# Patient Record
Sex: FEMALE | URBAN - METROPOLITAN AREA
[De-identification: names, ages, dates, MRNs, and addresses within clinical notes are randomized per-mention and may not be internally consistent; named-entity substitution may affect disease eponyms.]

---

## 2024-11-18 ENCOUNTER — TELEPHONE (OUTPATIENT)
Dept: HEALTH INFORMATION MANAGEMENT | Facility: OTHER | Age: 44
End: 2024-11-18
Payer: COMMERCIAL

## 2024-12-19 ENCOUNTER — APPOINTMENT (OUTPATIENT)
Dept: NEUROLOGY | Facility: MEDICAL CENTER | Age: 44
End: 2024-12-19
Attending: PSYCHIATRY & NEUROLOGY
Payer: COMMERCIAL

## 2025-01-10 ENCOUNTER — TELEPHONE (OUTPATIENT)
Dept: NEUROLOGY | Facility: MEDICAL CENTER | Age: 45
End: 2025-01-10

## 2025-01-10 NOTE — TELEPHONE ENCOUNTER
NEUROLOGY PATIENT PRE-VISIT PLANNING     Patient was NOT contacted to complete PVP.  Note: Patient will not be contacted if there is no indication to call.     Patient Appointment is scheduled as: New Patient     Is visit type and length scheduled correctly? Yes    [employer] Patient is checked in Patient Demographics? Yes    3.   Is referral attached to visit? Yes    4. Were records received from referring provider? Yes    4. Patient was NOT contacted to have someone accompany them to visit.     5. Is this appointment scheduled as a Hospital Follow-Up?  No    6. Does the patient require any pre procedure or post procedure follow up? No    7. If any orders were placed at last visit or intended to be done for this visit do we have Results/Consult Notes? No  Labs - Labs were not ordered at last office visit.  Imaging - Imaging was not ordered at last office visit.  Referrals - No referrals were ordered at last office visit.  Note: If patient appointment is for lab or imaging review and patient did not complete the studies, check with provider if OK to reschedule patient until completed.    8. If patient appointment is for Botox - is order pended for provider? N/A    9. Was Plan Assessment from last Neurology Office Visit Reviewed?  No

## 2025-01-25 NOTE — PROGRESS NOTES
[facility]  GENERAL NEUROLOGY  NEW PATIENT VISIT    Referral source: [facility]    CC: Headache; fibromyalgia; chronic pain    HISTORY OF ILLNESS:  Mica Cabrera is a 44 y.o. woman with a history most notable for headache.  She presented to her primary provider in [place] and was referred to neurology for better management of her headache syndromes.  She was recently diagnosed with fibromyalgia and requested genetic testing for Erler's Danlos syndrome.  Today, Mica provided the following history:    Headache description:    The following is a summary of headache symptoms, presented in my standard format:    Family History:   Age at onset (years):   Location:   Radiation:   Frequency:   Duration:   Headache Days/Month:   Quality:   Intensity:   Aura:   Photophobia/Phonophobia/Nausea/Vomiting:   Provoked by Physical Activity?:   Triggers:   Associated Symptoms:   Autonomic Signs (such as ptosis, miosis, conjunctival injection, rhinorrhea, increased lacrimation):   Head Trauma:   Association with Menses:   ED Visits:   Hospitalizations:   Missed Work Days ():   Sleep (hours/night):   Caffeine Intake:   Hydration:   Nutrition:   Exercise:   Analgesic Overuse:     Current Medication Regimen:  -     Medications Tried: Response  Preventive:  -     Rescue:  -     Medications Not Tried:  -     MEDICAL AND SURGICAL HISTORY:  No past medical history on file.  No past surgical history on file.  MEDICATIONS:  No current outpatient medications on file.     SOCIAL HISTORY:  Social History     Tobacco Use    Smoking status: Not on file    Smokeless tobacco: Not on file   Substance Use Topics    Alcohol use: Not on file     Social History     Social History Narrative    Not on file     FAMILY HISTORY:  No family history on file.    Vitals ***    REVIEW OF SYSTEMS:  A ROS was completed.  Pertinent positives and negatives were included in the HPI, above.  All other systems were reviewed and  "are negative.    PHYSICAL EXAM:  General/Medical:  - NAD  - hair, skin, nails, and joints were normal    Neuro:  MENTAL STATUS: awake and alert; no deficits of speech or language; oriented to person, place, and time; affect was appropriate to situation    CRANIAL NERVES:    II: acuity: J***/J***, fields: intact to confrontation, pupils: 3/3 to 2/2 without a relative afferent pupillary defect, discs: sharp, no red desaturation noted    III/IV/VI: versions: intact without nystagmus    V: facial sensation: symmetric to light touch    VII: facial expression: symmetric    VIII: hearing: intact to finger rub    IX/X: palate: elevates symmetrically    XI: shoulder shrug: symmetric    XII: tongue: midline    MOTOR:  - bulk: normal throughout  - tone: normal throughout  Upper Extremity Strength  (R/L)    5/5   Elbow flexion 5/5   Elbow extension 5/5   Shoulder abduction 5/5     Lower Extremity Strength  (R/L)   Hip flexion 5/5   Knee extension 5/5   Knee flexion 5/5   Ankle plantarflexion 5/5   Ankle dorsiflexion 5/5     - pronator drift: absent  - abnormal movements: none    SENSATION:  - light touch: ***  - vibration (R/L, seconds): ***/*** at the great toes  - temperature:***  - pinprick: ***  - proprioception: ***  - Romberg: absent    COORDINATION:  - finger to nose: normal, no ataxia on exam  - finger tapping: rapid and accurate, bilaterally  - foot tapping:***  - foot inversion/ eversion: ***  - clonus:***    REFLEXES:  Reflex Right Left   BR 2+ 2+   Biceps 2+ 2+   Triceps 2+ 2+   Patellae 2+ 2+   Achilles 2+ 2+   Toes down down     GAIT:  - narrow base and normal  - heel-walk:   - toe-walk:   - tandem gait: intact    REVIEW OF IMAGING STUDIES: I reviewed the following studies:  MRI Brain:  Date: N/A  W/o and w/ contrast?: N/A  Indication: \"N/A\"  Comparison: N/A  Impression:  \"N/A\"    REVIEW OF LABORATORY STUDIES:  No current pertinent labs    ASSESSMENT& PLAN:          "

## 2025-01-29 ENCOUNTER — APPOINTMENT (OUTPATIENT)
Dept: NEUROLOGY | Facility: MEDICAL CENTER | Age: 45
End: 2025-01-29

## 2025-03-12 ENCOUNTER — TELEPHONE (OUTPATIENT)
Dept: NEUROLOGY | Facility: MEDICAL CENTER | Age: 45
End: 2025-03-12

## 2025-03-13 NOTE — PROGRESS NOTES
[facility]  GENERAL NEUROLOGY  NEW PATIENT VISIT    Referral source: [facility]    CC: Headache; fibromyalgia, chronic pain    HISTORY OF ILLNESS:  Mica Cabrera is a 44 y.o. woman with a history most notable for headaches.  She presented to her primary provider in [place] with complaints of ongoing headaches.  There is a suspicion that she has fibromyalgia versus Sera Danlos syndrome.  Today, Mica provided the following history:    Headache description    The following is a summary of headache symptoms, presented in my standard format:    Family History:   Age at onset (years):   Location:   Radiation:   Frequency:   Duration:   Headache Days/Month:   Quality:   Intensity:   Aura:   Photophobia/Phonophobia/Nausea/Vomiting:   Provoked by Physical Activity?:   Triggers:   Associated Symptoms:   Autonomic Signs (such as ptosis, miosis, conjunctival injection, rhinorrhea, increased lacrimation):   Head Trauma:   Association with Menses:   ED Visits:   Hospitalizations:   Missed Work Days ():   Sleep (hours/night):   Caffeine Intake:   Hydration:   Nutrition:   Exercise:   Analgesic Overuse:     Current Medication Regimen:  -     Medications Tried: Response  Preventive:  -     Rescue:  -     Medications Not Tried:  -     MEDICAL AND SURGICAL HISTORY:  No past medical history on file.  No past surgical history on file.  MEDICATIONS:  No current outpatient medications on file.     SOCIAL HISTORY:  Social History     Tobacco Use    Smoking status: Not on file    Smokeless tobacco: Not on file   Substance Use Topics    Alcohol use: Not on file     Social History     Social History Narrative    Not on file     FAMILY HISTORY:  No family history on file.    Vitals ***    REVIEW OF SYSTEMS:  A ROS was completed.  Pertinent positives and negatives were included in the HPI, above.  All other systems were reviewed and are negative.    PHYSICAL EXAM:  General/Medical:  - NAD  - hair, skin,  "nails, and joints were normal    Neuro:  MENTAL STATUS: awake and alert; no deficits of speech or language; oriented to person, place, and time; affect was appropriate to situation    CRANIAL NERVES:    II: acuity: J***/J***, fields: intact to confrontation, pupils: 3/3 to 2/2 without a relative afferent pupillary defect, discs: sharp, no red desaturation noted    III/IV/VI: versions: intact without nystagmus    V: facial sensation: symmetric to light touch    VII: facial expression: symmetric    VIII: hearing: intact to finger rub    IX/X: palate: elevates symmetrically    XI: shoulder shrug: symmetric    XII: tongue: midline    MOTOR:  - bulk: normal throughout  - tone: normal throughout  Upper Extremity Strength  (R/L)    5/5   Elbow flexion 5/5   Elbow extension 5/5   Shoulder abduction 5/5     Lower Extremity Strength  (R/L)   Hip flexion 5/5   Knee extension 5/5   Knee flexion 5/5   Ankle plantarflexion 5/5   Ankle dorsiflexion 5/5     - pronator drift: absent  - abnormal movements: none    SENSATION:  - light touch: ***  - vibration (R/L, seconds): ***/*** at the great toes  - temperature:***  - pinprick: ***  - proprioception: ***  - Romberg: absent    COORDINATION:  - finger to nose: normal, no ataxia on exam  - finger tapping: rapid and accurate, bilaterally  - foot tapping:***  - foot inversion/ eversion: ***  - clonus:***    REFLEXES:  Reflex Right Left   BR 2+ 2+   Biceps 2+ 2+   Triceps 2+ 2+   Patellae 2+ 2+   Achilles 2+ 2+   Toes down down     GAIT:  - narrow base and normal  - heel-walk:   - toe-walk:   - tandem gait: intact    REVIEW OF IMAGING STUDIES: I reviewed the following studies:  MRI Brain:  Date: N/A  W/o and w/ contrast?:  N/A  Indication: \"N/A\"  Comparison: N/A  Impression:  \"N/A\"    REVIEW OF LABORATORY STUDIES:  No current pertinent labs    ASSESSMENT& PLAN:      Signed: ESTUARDO Long"

## 2025-03-14 ENCOUNTER — APPOINTMENT (OUTPATIENT)
Dept: NEUROLOGY | Facility: MEDICAL CENTER | Age: 45
End: 2025-03-14

## 2025-07-02 ENCOUNTER — OFFICE VISIT (OUTPATIENT)
Facility: MEDICAL CENTER | Age: 45
End: 2025-07-02
Attending: PSYCHIATRY & NEUROLOGY
Payer: COMMERCIAL

## 2025-07-02 VITALS
RESPIRATION RATE: 16 BRPM | SYSTOLIC BLOOD PRESSURE: 114 MMHG | TEMPERATURE: 97.7 F | HEIGHT: 62 IN | DIASTOLIC BLOOD PRESSURE: 76 MMHG | HEART RATE: 113 BPM | OXYGEN SATURATION: 97 % | WEIGHT: 180 LBS | BODY MASS INDEX: 33.13 KG/M2

## 2025-07-02 DIAGNOSIS — G43.009 MIGRAINE WITHOUT AURA AND WITHOUT STATUS MIGRAINOSUS, NOT INTRACTABLE: Primary | ICD-10-CM

## 2025-07-02 PROCEDURE — 99205 OFFICE O/P NEW HI 60 MIN: CPT | Performed by: PSYCHIATRY & NEUROLOGY

## 2025-07-02 PROCEDURE — 3074F SYST BP LT 130 MM HG: CPT | Performed by: PSYCHIATRY & NEUROLOGY

## 2025-07-02 PROCEDURE — 3078F DIAST BP <80 MM HG: CPT | Performed by: PSYCHIATRY & NEUROLOGY

## 2025-07-02 RX ORDER — SUMATRIPTAN 20 MG/1
SPRAY NASAL
Qty: 10 EACH | Refills: 5 | Status: SHIPPED | OUTPATIENT
Start: 2025-07-02 | End: 2025-07-16

## 2025-07-02 RX ORDER — TOPIRAMATE 25 MG/1
TABLET, FILM COATED ORAL
Qty: 49 TABLET | Refills: 0 | Status: SHIPPED | OUTPATIENT
Start: 2025-07-02 | End: 2025-07-23

## 2025-07-02 RX ORDER — BUPROPION HYDROCHLORIDE 300 MG/1
TABLET ORAL
COMMUNITY
Start: 2025-06-10

## 2025-07-02 RX ORDER — FERROUS SULFATE 325(65) MG
TABLET ORAL
COMMUNITY
Start: 2025-06-10

## 2025-07-02 RX ORDER — MONTELUKAST SODIUM 10 MG/1
10 TABLET ORAL DAILY
COMMUNITY
Start: 2025-04-19

## 2025-07-02 RX ORDER — LISINOPRIL 5 MG/1
5 TABLET ORAL DAILY
COMMUNITY
Start: 2025-06-22

## 2025-07-02 RX ORDER — ONDANSETRON 4 MG/1
4 TABLET, FILM COATED ORAL EVERY 6 HOURS PRN
Qty: 20 TABLET | Refills: 5 | Status: SHIPPED | OUTPATIENT
Start: 2025-07-02

## 2025-07-02 RX ORDER — ARIPIPRAZOLE 2 MG/1
2 TABLET ORAL NIGHTLY
COMMUNITY
Start: 2025-06-26

## 2025-07-02 RX ORDER — CETIRIZINE HYDROCHLORIDE 10 MG/1
10 TABLET ORAL DAILY
COMMUNITY

## 2025-07-02 ASSESSMENT — ENCOUNTER SYMPTOMS
NECK PAIN: 1
HEADACHES: 1
LOSS OF CONSCIOUSNESS: 0
MEMORY LOSS: 0

## 2025-07-02 ASSESSMENT — PATIENT HEALTH QUESTIONNAIRE - PHQ9: CLINICAL INTERPRETATION OF PHQ2 SCORE: 0

## 2025-07-02 NOTE — PROGRESS NOTES
"Subjective     Mica Cabrera is a 45 y.o. female who presents from the office of PB Viera, for consultation, with a history of migraine headaches.     JJ Nino is a very pleasant 45-year-old right-handed young woman who is suffering from headaches since her adolescence, and which have persisted in a fairly unrelenting pattern, always worse in the summer months especially, since then.    Prodrome: None    Aura: For the first time just last week she suffered from a headache that was preceded by \"dancing lights\" in both eyes.  The symptoms lasted for 5 minutes before the headache began.    Headache: She describes pain that typically starts under the eye, right greater than left-sided, but then circles above and around, behind as well, and then over the ipsilateral hemicranium back into the occiput and neck.  The pain is throbbing and incapacitating, nausea occurs without vomiting, heightened sensitivities to light more than sound as well as skin hyperalgesia ipsilaterally occur.  Movement is avoided, concentration severely curtailed.  There are some mild autonomic symptoms, the neck can get a little stiff and tender.    Duration: Typically 3-4 days including the post drome.    Start: Adolescence.    Onset: Typically midday.    Frequency: In the summer months she is having upwards of 4-5 full on headache attacks every month.  At best she is once a month and this is during the winter months.    Triggers: Heat and allergies along with cigarette smoke.  It is inconsistently associated with menses.    Workup: None    Treatment: She has not been on prescription medications.  She takes 2 Excedrin Migraine along with 2 Motrin tablets daily on those days with headache pain.  This simply takes the edge off.    Medical history is remarkable for MDD as well as she suspects Autism Spectrum, migraine without aura, hypertension, and fibromyalgia.  There is no history of diabetes, CAD, CVA, PVD, " "dyslipidemia, liver or kidney disease, IBD, pulmonary disease, MS, seizure, blood dyscrasia, neurodegenerative disease, tremor, or sleep disorder.    The surgical history is unremarkable.    The family history is remarkable for migraine in her mother, sister and maternal aunt.  Her mother also suffer from stroke and cancer, her father from a stroke.  Her sister suffer from Crohn's disease.    Socially, she does not smoke, drinks occasionally but avoids red wine.  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  There has been a significant degree of stress from earlier this year when she lost both her parents and her sister.    She is on Abilify 2 mg every evening, Wellbutrin  mg daily, Prinivil 5 mg daily, Singulair, Zyrtec and 65 FE.    Review of Systems   Constitutional:  Negative for malaise/fatigue.   Musculoskeletal:  Positive for neck pain.   Neurological:  Positive for headaches. Negative for loss of consciousness.   Psychiatric/Behavioral:  Negative for memory loss.    All other systems reviewed and are negative.    Objective     /76 (BP Location: Left arm, Patient Position: Sitting, BP Cuff Size: Adult)   Pulse (!) 113   Temp 36.5 °C (97.7 °F) (Temporal)   Resp 16   Ht 1.575 m (5' 2\")   Wt 81.6 kg (180 lb)   SpO2 97%   BMI 32.92 kg/m²      Physical Exam    She appears in no acute distress.  Her vital signs are stable, though her pulse is elevated at 113.  Her rhythm is regular.  There is no malar rash, jaw or temporal tenderness, jaw claudication, or allodynia.  The occipital ridge is nontender, occipital notch as well.  Cervical paraspinal and trapezius muscle bodies are nontender bilaterally, there is no spasm.  Range of motion of the cervical spine is full.  Carotid pulses are present bilaterally without asymmetry.  Cardiac evaluation reveals a regular rhythm.     Neurological Exam    Fully oriented, there is no aphasia, agnosia, apraxia, or inattention.    PERRLA/EOMI, visual fields " are full to finger counting on confrontation bilaterally.  Facial movements are symmetric, sensory exam is intact to temperature and light touch bilaterally.  The tongue and uvula are midline, jaw movements are intact.  Shoulder shrug and head rotation are normal.  There is no bulbar dysfunction.    Musculoskeletal exam reveals normal tone bilaterally, there is no tremor, asterixis, or drift.  Strength is 5/5 throughout.  Reflexes are present throughout, there are no asymmetries, the toes are downgoing bilaterally.    She stands easily, gait is normal and station stride length, armswing is symmetric.  Heel, toe, and tandem walking are all normal.  There is no appendicular dystaxia.  Fine motor control is normal throughout.    Sensory exam is intact to vibration and temperature, Romberg is absent.  Assessment & Plan  Migraine without aura and without status migrainosus, not intractable  Her diagnosis is: Straightforward, interestingly she had her first aura just last week, and about 80% of migraine suffers do evolved from migraine without aura to headaches that come with and without them.  Regardless, she has a less than 0.2% chance that these headaches are symptomatic of anything else.  Thus workup is not required at this time.  She was told that other headache types can occur, they may require more directed workup, and they will feel vastly different to her.    During the summer months she fits criteria for chronic migraine, she has easily upwards of 20 days in a month with headache pain, and of these more than 10 days are associated with 4 hours of incapacity.  During the winter months when she has a headache once a month, it is still 4 days every month but not filling criteria for chronic migraine.  Since we are treating when they are at their worst, I would recommend starting a maintenance therapy as well as more effective rescue and then this can be adjusted over time depending on time of year.  She felt quite  comfortable with this logic.    We talked about the nature of migraine and how the drugs used to treat the disorder work effectively.  For her she has been naïve to treatment so we have quite a few options left.  Topamax titration will be started at 25 mg nightly, increase to 50 mg nightly and then 100 mg nightly.  Side effects were reviewed.  She will contact the office to report on tolerability and efficacy is.  Higher doses can be used if needed.  For rescue, Imitrex 20 mg nasal spray will be used along with Zofran 4 mg tablets.  Technique of the nasal spray was demonstrated as it is critical for efficacy and tolerability.  She is to use 1 of each when the pain first begins, she can then repeat that dose within an hour or longer if needed.    Orders:    ondansetron (ZOFRAN) 4 MG Tab tablet; Take 1 Tablet by mouth every 6 hours as needed for Nausea/Vomiting.    sumatriptan (IMITREX) 20 MG/ACT nasal spray; 1 spray at headache onset; repeat in 1 hour prn    topiramate (TOPAMAX) 25 MG Tab; Take 1 Tablet by mouth every evening for 7 days, THEN 2 Tablets every evening for 7 days, THEN 4 Tablets every evening for 7 days.    We will follow-up in 6 months.    Time: 60 minutes in total spent in patient care including collection charting, record review, discussion with healthcare staff and documentation.  This includes face-to-face time for exam, review, discussion, as well as counseling and coordinating care.

## 2025-07-02 NOTE — ASSESSMENT & PLAN NOTE
Her diagnosis is: Straightforward, interestingly she had her first aura just last week, and about 80% of migraine suffers do evolved from migraine without aura to headaches that come with and without them.  Regardless, she has a less than 0.2% chance that these headaches are symptomatic of anything else.  Thus workup is not required at this time.  She was told that other headache types can occur, they may require more directed workup, and they will feel vastly different to her.    During the summer months she fits criteria for chronic migraine, she has easily upwards of 20 days in a month with headache pain, and of these more than 10 days are associated with 4 hours of incapacity.  During the winter months when she has a headache once a month, it is still 4 days every month but not filling criteria for chronic migraine.  Since we are treating when they are at their worst, I would recommend starting a maintenance therapy as well as more effective rescue and then this can be adjusted over time depending on time of year.  She felt quite comfortable with this logic.    We talked about the nature of migraine and how the drugs used to treat the disorder work effectively.  For her she has been naïve to treatment so we have quite a few options left.  Topamax titration will be started at 25 mg nightly, increase to 50 mg nightly and then 100 mg nightly.  Side effects were reviewed.  She will contact the office to report on tolerability and efficacy is.  Higher doses can be used if needed.  For rescue, Imitrex 20 mg nasal spray will be used along with Zofran 4 mg tablets.  Technique of the nasal spray was demonstrated as it is critical for efficacy and tolerability.  She is to use 1 of each when the pain first begins, she can then repeat that dose within an hour or longer if needed.    Orders:    ondansetron (ZOFRAN) 4 MG Tab tablet; Take 1 Tablet by mouth every 6 hours as needed for Nausea/Vomiting.    sumatriptan (IMITREX)  20 MG/ACT nasal spray; 1 spray at headache onset; repeat in 1 hour prn    topiramate (TOPAMAX) 25 MG Tab; Take 1 Tablet by mouth every evening for 7 days, THEN 2 Tablets every evening for 7 days, THEN 4 Tablets every evening for 7 days.

## 2025-07-16 DIAGNOSIS — G43.009 MIGRAINE WITHOUT AURA AND WITHOUT STATUS MIGRAINOSUS, NOT INTRACTABLE: Primary | ICD-10-CM

## 2025-07-16 RX ORDER — SUMATRIPTAN SUCCINATE 100 MG/1
TABLET ORAL
Qty: 9 TABLET | Refills: 6 | Status: SHIPPED | OUTPATIENT
Start: 2025-07-16

## 2025-07-29 DIAGNOSIS — G43.009 MIGRAINE WITHOUT AURA AND WITHOUT STATUS MIGRAINOSUS, NOT INTRACTABLE: ICD-10-CM

## 2025-08-01 DIAGNOSIS — G43.009 MIGRAINE WITHOUT AURA AND WITHOUT STATUS MIGRAINOSUS, NOT INTRACTABLE: ICD-10-CM

## 2025-08-01 RX ORDER — TOPIRAMATE 25 MG/1
100 TABLET, FILM COATED ORAL DAILY
Qty: 120 TABLET | Refills: 5 | Status: SHIPPED | OUTPATIENT
Start: 2025-08-01 | End: 2025-08-05

## 2025-08-05 RX ORDER — TOPIRAMATE 100 MG/1
100 TABLET, FILM COATED ORAL DAILY
Qty: 90 TABLET | Refills: 3 | Status: SHIPPED | OUTPATIENT
Start: 2025-08-05